# Patient Record
Sex: FEMALE | NOT HISPANIC OR LATINO | Employment: OTHER | URBAN - METROPOLITAN AREA
[De-identification: names, ages, dates, MRNs, and addresses within clinical notes are randomized per-mention and may not be internally consistent; named-entity substitution may affect disease eponyms.]

---

## 2023-10-25 PROBLEM — M54.16 LEFT LUMBAR RADICULOPATHY: Status: ACTIVE | Noted: 2023-10-25

## 2023-10-25 PROBLEM — M54.12 CERVICAL RADICULOPATHY: Status: ACTIVE | Noted: 2023-10-25

## 2023-10-25 PROBLEM — M47.12 MYELOPATHY DUE TO CERVICAL SPONDYLOSIS: Status: ACTIVE | Noted: 2023-10-25

## 2023-10-25 RX ORDER — MELOXICAM 15 MG/1
TABLET ORAL
COMMUNITY
Start: 2022-10-29

## 2023-10-25 RX ORDER — GABAPENTIN 100 MG/1
CAPSULE ORAL
COMMUNITY
Start: 2022-11-30

## 2023-10-25 RX ORDER — AMLODIPINE BESYLATE 5 MG/1
TABLET ORAL
COMMUNITY
Start: 2022-12-17

## 2023-10-25 RX ORDER — ATORVASTATIN CALCIUM 20 MG/1
TABLET, FILM COATED ORAL
COMMUNITY
Start: 2022-11-11

## 2023-10-26 ENCOUNTER — OFFICE VISIT (OUTPATIENT)
Dept: NEUROSURGERY | Facility: CLINIC | Age: 63
End: 2023-10-26
Payer: COMMERCIAL

## 2023-10-26 VITALS
SYSTOLIC BLOOD PRESSURE: 138 MMHG | TEMPERATURE: 97.5 F | HEIGHT: 64 IN | DIASTOLIC BLOOD PRESSURE: 88 MMHG | WEIGHT: 162 LBS | HEART RATE: 87 BPM | BODY MASS INDEX: 27.66 KG/M2

## 2023-10-26 DIAGNOSIS — M47.12 MYELOPATHY DUE TO CERVICAL SPONDYLOSIS: Primary | ICD-10-CM

## 2023-10-26 DIAGNOSIS — M54.12 CERVICAL RADICULOPATHY: ICD-10-CM

## 2023-10-26 PROCEDURE — 99213 OFFICE O/P EST LOW 20 MIN: CPT | Performed by: NEUROLOGICAL SURGERY

## 2023-10-26 ASSESSMENT — PATIENT HEALTH QUESTIONNAIRE - PHQ9
SUM OF ALL RESPONSES TO PHQ9 QUESTIONS 1 AND 2: 0
1. LITTLE INTEREST OR PLEASURE IN DOING THINGS: NOT AT ALL
2. FEELING DOWN, DEPRESSED OR HOPELESS: NOT AT ALL

## 2023-10-26 ASSESSMENT — ENCOUNTER SYMPTOMS: OCCASIONAL FEELINGS OF UNSTEADINESS: 0

## 2023-10-26 ASSESSMENT — PAIN SCALES - GENERAL: PAINLEVEL: 5

## 2023-10-26 NOTE — PROGRESS NOTES
[facility] [place]  Department of Neurological Surgery  Established Patient Visit    History of Present Illness  Arlene Esposito is a 63 y.o. year old female who presents to the spine clinic in follow up with her CAT scan of the cervical spine and the MRI of her cervical spine.  She still having all the trouble with her left shoulder and arm pain.  She does have difficulty with dropping things and fine motor coordination.  Unfortunately the CAT scan shows that she has a spontaneous fusion on the left facet at C4-5.  A portion of her left shoulder pain therefore going to be permanent.  My concern however is that she has significant cord compression at C3-4 and C6-7.  I also think that she has some foraminal stenosis bilaterally at C5-6.  If and when she gets to the point where she cannot take it any longer or does not want to take it any longer she should consider having three-level anterior cervical disc excision with anterior interbody fusion and plate fixation.  I went over the operation with her in detail. We discussed risks,(these include but are not limited to - bleeding, infection, paralysis, muscle weakness, CSF leak, bowel or bladder dysfunction, incomplete resolution of pain or numbness, DVT/PE, heart attack, stroke, and other unforeseen medical and anesthesia complications) benefits, and outcome possibilities as well as the alternatives to this form of therapy. She understands and would like to proceed by considering her options and possibly getting other opinions.  I told her we will be here when she decides what she wants to do.    Patient's BMI is Body mass index is 27.81 kg/m².    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  No past surgical history on  file.  Social History     Tobacco Use    Smoking status: Every Day     Types: Cigarettes    Smokeless tobacco: Never   Substance Use Topics    Alcohol use: Yes     Comment: Socially     family history is not on file.    Current Outpatient Medications:     amLODIPine (Norvasc) 5 mg tablet, , Disp: , Rfl:     atorvastatin (Lipitor) 20 mg tablet, , Disp: , Rfl:     gabapentin (Neurontin) 100 mg capsule, , Disp: , Rfl:     meloxicam (Mobic) 15 mg tablet, , Disp: , Rfl:   Allergies   Allergen Reactions    Penicillins Unknown         Results:  I personally reviewed and interpreted the imaging results which included CAT scan of the cervical spine.  The results are discussed above    Assessment and Plan:      Arlene Esposito is a 63 y.o. year old female who presents to the spine clinic in follow up with cervical canal stenosis with myelopathy and radiculopathy secondary to spondylosis and a spontaneous fusion at C4-5. If and when she gets to the point where she cannot take it any longer or does not want to take it any longer she should consider having three-level anterior cervical disc excision with anterior interbody fusion and plate fixation.  I went over the operation with her in detail. We discussed risks,(these include but are not limited to - bleeding, infection, paralysis, muscle weakness, CSF leak, bowel or bladder dysfunction, incomplete resolution of pain or numbness, DVT/PE, heart attack, stroke, and other unforeseen medical and anesthesia complications) benefits, and outcome possibilities as well as the alternatives to this form of therapy. She understands and would like to proceed by considering her options and possibly getting other opinions.  I told her we will be here when she decides what she wants to do.      I have reviewed all prior documentation and reviewed the electronic medical record since admission. I have personally have reviewed all advanced imaging not just the reports and used my interpretation  as documented as the relevant findings. I have reviewed the risks and benefits of all treatment recommendations listed in this note with the patient and family. I spent a total of 20 minutes in service to this patient's care during this date of service.      The above clinical summary has been dictated with voice recognition software. It has not been proofread for grammatical errors, typographical mistakes, or other semantic inconsistencies.    Thank you for visiting our office today. It was our pleasure to take part in your healthcare.     Do not hesitate to call with any questions regarding your plan of care after leaving. My office can be reached at [phone] M-F 8am-4pm.     To clinicians, thank you very much for this kind referral. It is a privilege to partner with you in the care of your patients. My office would be delighted to assist you with any further consultations or with questions regarding the plan of care outlined. Do not hesitate to call the office or contact me directly.     Sincerely,    Daniel Garrison MD, FAANS, FACS  Board Certified Neurological Surgeon  , Department of Neurological Surgery  [facility] [employer]    [facility]  [address]  [facility] [address]  [place] [address]    [facility]  [address]  [address]  [place] [address]    Phone: [phone]  Fax: [phone]